# Patient Record
Sex: MALE | Race: WHITE | Employment: PART TIME | URBAN - METROPOLITAN AREA
[De-identification: names, ages, dates, MRNs, and addresses within clinical notes are randomized per-mention and may not be internally consistent; named-entity substitution may affect disease eponyms.]

---

## 2018-10-15 ENCOUNTER — HOSPITAL ENCOUNTER (EMERGENCY)
Age: 23
Discharge: HOME | End: 2018-10-15
Attending: EMERGENCY MEDICINE
Payer: COMMERCIAL

## 2018-10-15 ENCOUNTER — APPOINTMENT (OUTPATIENT)
Dept: GENERAL RADIOLOGY | Age: 23
End: 2018-10-15
Payer: COMMERCIAL

## 2018-10-15 VITALS
HEIGHT: 70 IN | SYSTOLIC BLOOD PRESSURE: 114 MMHG | WEIGHT: 230 LBS | RESPIRATION RATE: 18 BRPM | OXYGEN SATURATION: 97 % | HEART RATE: 92 BPM | BODY MASS INDEX: 32.93 KG/M2 | DIASTOLIC BLOOD PRESSURE: 59 MMHG | TEMPERATURE: 98.9 F

## 2018-10-15 DIAGNOSIS — J98.01 BRONCHOSPASM: ICD-10-CM

## 2018-10-15 DIAGNOSIS — B34.9 VIRAL SYNDROME: Primary | ICD-10-CM

## 2018-10-15 DIAGNOSIS — E86.0 DEHYDRATION: ICD-10-CM

## 2018-10-15 LAB
ANION GAP SERPL CALCULATED.3IONS-SCNC: 15 MMOL/L (ref 7–16)
BUN BLDV-MCNC: 11 MG/DL (ref 6–20)
CALCIUM SERPL-MCNC: 9.1 MG/DL (ref 8.6–10.2)
CHLORIDE BLD-SCNC: 103 MMOL/L (ref 98–107)
CO2: 25 MMOL/L (ref 22–29)
CREAT SERPL-MCNC: 0.9 MG/DL (ref 0.7–1.2)
EKG ATRIAL RATE: 83 BPM
EKG P AXIS: 2 DEGREES
EKG P-R INTERVAL: 202 MS
EKG Q-T INTERVAL: 394 MS
EKG QRS DURATION: 154 MS
EKG QTC CALCULATION (BAZETT): 462 MS
EKG R AXIS: -42 DEGREES
EKG T AXIS: 90 DEGREES
EKG VENTRICULAR RATE: 83 BPM
GFR AFRICAN AMERICAN: >60
GFR NON-AFRICAN AMERICAN: >60 ML/MIN/1.73
GLUCOSE BLD-MCNC: 102 MG/DL (ref 74–109)
HCT VFR BLD CALC: 42.9 % (ref 37–54)
HEMOGLOBIN: 15 G/DL (ref 12.5–16.5)
INFLUENZA A BY PCR: NOT DETECTED
INFLUENZA B BY PCR: NOT DETECTED
LACTIC ACID: 1 MMOL/L (ref 0.5–2.2)
LACTIC ACID: 3.2 MMOL/L (ref 0.5–2.2)
LIPASE: 23 U/L (ref 13–60)
MCH RBC QN AUTO: 31.8 PG (ref 26–35)
MCHC RBC AUTO-ENTMCNC: 35 % (ref 32–34.5)
MCV RBC AUTO: 91.1 FL (ref 80–99.9)
PDW BLD-RTO: 12.2 FL (ref 11.5–15)
PLATELET # BLD: 145 E9/L (ref 130–450)
PMV BLD AUTO: 10.7 FL (ref 7–12)
POTASSIUM SERPL-SCNC: 4 MMOL/L (ref 3.5–5)
RBC # BLD: 4.71 E12/L (ref 3.8–5.8)
SODIUM BLD-SCNC: 143 MMOL/L (ref 132–146)
TROPONIN: <0.01 NG/ML (ref 0–0.03)
WBC # BLD: 8.9 E9/L (ref 4.5–11.5)

## 2018-10-15 PROCEDURE — 94640 AIRWAY INHALATION TREATMENT: CPT

## 2018-10-15 PROCEDURE — 6370000000 HC RX 637 (ALT 250 FOR IP): Performed by: EMERGENCY MEDICINE

## 2018-10-15 PROCEDURE — 83690 ASSAY OF LIPASE: CPT

## 2018-10-15 PROCEDURE — 80048 BASIC METABOLIC PNL TOTAL CA: CPT

## 2018-10-15 PROCEDURE — 36415 COLL VENOUS BLD VENIPUNCTURE: CPT

## 2018-10-15 PROCEDURE — 2580000003 HC RX 258

## 2018-10-15 PROCEDURE — 87040 BLOOD CULTURE FOR BACTERIA: CPT

## 2018-10-15 PROCEDURE — 99284 EMERGENCY DEPT VISIT MOD MDM: CPT

## 2018-10-15 PROCEDURE — 85027 COMPLETE CBC AUTOMATED: CPT

## 2018-10-15 PROCEDURE — 87502 INFLUENZA DNA AMP PROBE: CPT

## 2018-10-15 PROCEDURE — 84484 ASSAY OF TROPONIN QUANT: CPT

## 2018-10-15 PROCEDURE — 71046 X-RAY EXAM CHEST 2 VIEWS: CPT

## 2018-10-15 PROCEDURE — 96374 THER/PROPH/DIAG INJ IV PUSH: CPT

## 2018-10-15 PROCEDURE — 6360000002 HC RX W HCPCS: Performed by: EMERGENCY MEDICINE

## 2018-10-15 PROCEDURE — 83605 ASSAY OF LACTIC ACID: CPT

## 2018-10-15 PROCEDURE — 96361 HYDRATE IV INFUSION ADD-ON: CPT

## 2018-10-15 PROCEDURE — 2580000003 HC RX 258: Performed by: EMERGENCY MEDICINE

## 2018-10-15 RX ORDER — ALBUTEROL SULFATE 90 UG/1
2 AEROSOL, METERED RESPIRATORY (INHALATION) EVERY 6 HOURS PRN
Qty: 1 INHALER | Refills: 3 | Status: SHIPPED | OUTPATIENT
Start: 2018-10-15

## 2018-10-15 RX ORDER — SODIUM CHLORIDE 0.9 % (FLUSH) 0.9 %
SYRINGE (ML) INJECTION
Status: COMPLETED
Start: 2018-10-15 | End: 2018-10-15

## 2018-10-15 RX ORDER — 0.9 % SODIUM CHLORIDE 0.9 %
500 INTRAVENOUS SOLUTION INTRAVENOUS ONCE
Status: COMPLETED | OUTPATIENT
Start: 2018-10-15 | End: 2018-10-15

## 2018-10-15 RX ORDER — METHYLPREDNISOLONE SODIUM SUCCINATE 125 MG/2ML
125 INJECTION, POWDER, LYOPHILIZED, FOR SOLUTION INTRAMUSCULAR; INTRAVENOUS ONCE
Status: COMPLETED | OUTPATIENT
Start: 2018-10-15 | End: 2018-10-15

## 2018-10-15 RX ORDER — IPRATROPIUM BROMIDE AND ALBUTEROL SULFATE 2.5; .5 MG/3ML; MG/3ML
1 SOLUTION RESPIRATORY (INHALATION) ONCE
Status: COMPLETED | OUTPATIENT
Start: 2018-10-15 | End: 2018-10-15

## 2018-10-15 RX ORDER — IBUPROFEN 200 MG
400 TABLET ORAL EVERY 6 HOURS PRN
COMMUNITY

## 2018-10-15 RX ORDER — METOPROLOL SUCCINATE 25 MG/1
25 TABLET, EXTENDED RELEASE ORAL DAILY
COMMUNITY
Start: 2018-07-11

## 2018-10-15 RX ORDER — ONDANSETRON 4 MG/1
4 TABLET, ORALLY DISINTEGRATING ORAL EVERY 8 HOURS PRN
Qty: 10 TABLET | Refills: 0 | Status: SHIPPED | OUTPATIENT
Start: 2018-10-15 | End: 2019-04-06

## 2018-10-15 RX ADMIN — SODIUM CHLORIDE 500 ML: 9 INJECTION, SOLUTION INTRAVENOUS at 11:06

## 2018-10-15 RX ADMIN — METHYLPREDNISOLONE SODIUM SUCCINATE 125 MG: 125 INJECTION, POWDER, FOR SOLUTION INTRAMUSCULAR; INTRAVENOUS at 12:31

## 2018-10-15 RX ADMIN — SODIUM CHLORIDE 500 ML: 9 INJECTION, SOLUTION INTRAVENOUS at 12:30

## 2018-10-15 RX ADMIN — Medication: at 14:10

## 2018-10-15 RX ADMIN — IPRATROPIUM BROMIDE AND ALBUTEROL SULFATE 1 AMPULE: .5; 3 SOLUTION RESPIRATORY (INHALATION) at 10:01

## 2018-10-15 ASSESSMENT — ENCOUNTER SYMPTOMS
NAUSEA: 1
DIARRHEA: 1
EYES NEGATIVE: 1
WHEEZING: 1
CHEST TIGHTNESS: 1
ABDOMINAL PAIN: 1
SHORTNESS OF BREATH: 1
VOMITING: 1
COUGH: 1

## 2018-10-15 ASSESSMENT — PAIN DESCRIPTION - PROGRESSION: CLINICAL_PROGRESSION: GRADUALLY WORSENING

## 2018-10-15 ASSESSMENT — PAIN DESCRIPTION - LOCATION: LOCATION: ABDOMEN

## 2018-10-15 ASSESSMENT — PAIN DESCRIPTION - FREQUENCY: FREQUENCY: INTERMITTENT

## 2018-10-15 ASSESSMENT — PAIN DESCRIPTION - ORIENTATION: ORIENTATION: MID

## 2018-10-15 ASSESSMENT — PAIN DESCRIPTION - ONSET: ONSET: SUDDEN

## 2018-10-15 ASSESSMENT — PAIN DESCRIPTION - PAIN TYPE: TYPE: ACUTE PAIN

## 2018-10-15 ASSESSMENT — PAIN SCALES - GENERAL: PAINLEVEL_OUTOF10: 7

## 2018-10-15 ASSESSMENT — PAIN DESCRIPTION - DESCRIPTORS: DESCRIPTORS: STABBING;SORE;SHARP

## 2018-10-20 LAB
BLOOD CULTURE, ROUTINE: NORMAL
CULTURE, BLOOD 2: NORMAL

## 2019-04-06 ENCOUNTER — HOSPITAL ENCOUNTER (EMERGENCY)
Age: 24
Discharge: HOME | End: 2019-04-06
Payer: COMMERCIAL

## 2019-04-06 VITALS
RESPIRATION RATE: 16 BRPM | OXYGEN SATURATION: 95 % | HEART RATE: 88 BPM | DIASTOLIC BLOOD PRESSURE: 79 MMHG | TEMPERATURE: 98.7 F | WEIGHT: 220 LBS | SYSTOLIC BLOOD PRESSURE: 135 MMHG | BODY MASS INDEX: 31.5 KG/M2 | HEIGHT: 70 IN

## 2019-04-06 DIAGNOSIS — R11.2 NAUSEA VOMITING AND DIARRHEA: Primary | ICD-10-CM

## 2019-04-06 DIAGNOSIS — R19.7 NAUSEA VOMITING AND DIARRHEA: Primary | ICD-10-CM

## 2019-04-06 LAB
ALBUMIN SERPL-MCNC: 5 G/DL (ref 3.5–5.2)
ALP BLD-CCNC: 55 U/L (ref 40–129)
ALT SERPL-CCNC: 27 U/L (ref 0–40)
ANION GAP SERPL CALCULATED.3IONS-SCNC: 11 MMOL/L (ref 7–16)
AST SERPL-CCNC: 19 U/L (ref 0–39)
BACTERIA: ABNORMAL /HPF
BASOPHILS ABSOLUTE: 0.09 E9/L (ref 0–0.2)
BASOPHILS RELATIVE PERCENT: 0.9 % (ref 0–2)
BILIRUB SERPL-MCNC: 0.8 MG/DL (ref 0–1.2)
BILIRUBIN URINE: NEGATIVE
BLOOD, URINE: NEGATIVE
BUN BLDV-MCNC: 12 MG/DL (ref 6–20)
CALCIUM SERPL-MCNC: 9.6 MG/DL (ref 8.6–10.2)
CHLORIDE BLD-SCNC: 102 MMOL/L (ref 98–107)
CLARITY: CLEAR
CO2: 26 MMOL/L (ref 22–29)
COLOR: YELLOW
CREAT SERPL-MCNC: 0.9 MG/DL (ref 0.7–1.2)
EOSINOPHILS ABSOLUTE: 0 E9/L (ref 0.05–0.5)
EOSINOPHILS RELATIVE PERCENT: 0.2 % (ref 0–6)
EPITHELIAL CELLS, UA: ABNORMAL /HPF
GFR AFRICAN AMERICAN: >60
GFR NON-AFRICAN AMERICAN: >60 ML/MIN/1.73
GLUCOSE BLD-MCNC: 118 MG/DL (ref 74–99)
GLUCOSE URINE: NEGATIVE MG/DL
HCT VFR BLD CALC: 41.8 % (ref 37–54)
HEMOGLOBIN: 14.9 G/DL (ref 12.5–16.5)
INFLUENZA A BY PCR: NOT DETECTED
INFLUENZA B BY PCR: NOT DETECTED
KETONES, URINE: 40 MG/DL
LACTIC ACID: 1.2 MMOL/L (ref 0.5–2.2)
LEUKOCYTE ESTERASE, URINE: NEGATIVE
LIPASE: 17 U/L (ref 13–60)
LYMPHOCYTES ABSOLUTE: 0.4 E9/L (ref 1.5–4)
LYMPHOCYTES RELATIVE PERCENT: 4.3 % (ref 20–42)
MCH RBC QN AUTO: 32 PG (ref 26–35)
MCHC RBC AUTO-ENTMCNC: 35.6 % (ref 32–34.5)
MCV RBC AUTO: 89.9 FL (ref 80–99.9)
MONOCYTES ABSOLUTE: 0.5 E9/L (ref 0.1–0.95)
MONOCYTES RELATIVE PERCENT: 5.2 % (ref 2–12)
MUCUS: PRESENT
NEUTROPHILS ABSOLUTE: 8.91 E9/L (ref 1.8–7.3)
NEUTROPHILS RELATIVE PERCENT: 89.6 % (ref 43–80)
NITRITE, URINE: NEGATIVE
PDW BLD-RTO: 11.9 FL (ref 11.5–15)
PH UA: 6.5 (ref 5–9)
PLATELET # BLD: 165 E9/L (ref 130–450)
PMV BLD AUTO: 10.5 FL (ref 7–12)
POTASSIUM SERPL-SCNC: 3.7 MMOL/L (ref 3.5–5)
PROTEIN UA: 30 MG/DL
RBC # BLD: 4.65 E12/L (ref 3.8–5.8)
RBC # BLD: NORMAL 10*6/UL
RBC UA: ABNORMAL /HPF (ref 0–2)
SODIUM BLD-SCNC: 139 MMOL/L (ref 132–146)
SPECIFIC GRAVITY UA: 1.02 (ref 1–1.03)
TOTAL PROTEIN: 7.4 G/DL (ref 6.4–8.3)
UROBILINOGEN, URINE: 0.2 E.U./DL
WBC # BLD: 9.9 E9/L (ref 4.5–11.5)
WBC UA: ABNORMAL /HPF (ref 0–5)

## 2019-04-06 PROCEDURE — 87502 INFLUENZA DNA AMP PROBE: CPT

## 2019-04-06 PROCEDURE — 99284 EMERGENCY DEPT VISIT MOD MDM: CPT

## 2019-04-06 PROCEDURE — 83690 ASSAY OF LIPASE: CPT

## 2019-04-06 PROCEDURE — 36415 COLL VENOUS BLD VENIPUNCTURE: CPT

## 2019-04-06 PROCEDURE — 96374 THER/PROPH/DIAG INJ IV PUSH: CPT

## 2019-04-06 PROCEDURE — 2580000003 HC RX 258: Performed by: NURSE PRACTITIONER

## 2019-04-06 PROCEDURE — 81001 URINALYSIS AUTO W/SCOPE: CPT

## 2019-04-06 PROCEDURE — 83605 ASSAY OF LACTIC ACID: CPT

## 2019-04-06 PROCEDURE — 85025 COMPLETE CBC W/AUTO DIFF WBC: CPT

## 2019-04-06 PROCEDURE — 6360000002 HC RX W HCPCS: Performed by: NURSE PRACTITIONER

## 2019-04-06 PROCEDURE — 80053 COMPREHEN METABOLIC PANEL: CPT

## 2019-04-06 RX ORDER — 0.9 % SODIUM CHLORIDE 0.9 %
1000 INTRAVENOUS SOLUTION INTRAVENOUS ONCE
Status: COMPLETED | OUTPATIENT
Start: 2019-04-06 | End: 2019-04-06

## 2019-04-06 RX ORDER — ONDANSETRON 2 MG/ML
4 INJECTION INTRAMUSCULAR; INTRAVENOUS ONCE
Status: COMPLETED | OUTPATIENT
Start: 2019-04-06 | End: 2019-04-06

## 2019-04-06 RX ORDER — ONDANSETRON 4 MG/1
4 TABLET, ORALLY DISINTEGRATING ORAL EVERY 8 HOURS PRN
Qty: 10 TABLET | Refills: 0 | Status: SHIPPED | OUTPATIENT
Start: 2019-04-06 | End: 2020-04-05

## 2019-04-06 RX ADMIN — SODIUM CHLORIDE 1000 ML: 900 INJECTION, SOLUTION INTRAVENOUS at 21:33

## 2019-04-06 RX ADMIN — ONDANSETRON 4 MG: 2 INJECTION INTRAMUSCULAR; INTRAVENOUS at 21:33

## 2019-04-06 ASSESSMENT — PAIN DESCRIPTION - PAIN TYPE: TYPE: ACUTE PAIN

## 2019-04-06 ASSESSMENT — PAIN SCALES - GENERAL: PAINLEVEL_OUTOF10: 8

## 2019-04-07 NOTE — ED PROVIDER NOTES
rhythm, normal heart sounds, without pathological murmurs, ectopy, gallops, or rubs. GI:  General Appearance: normal.       Bowel sounds: normal bowel sounds. Distension:  None. Tenderness: Mild generalized abdominal tenderness. Liver: non-tender. Spleen:  non-palpable and non-tender. Pulsatile Mass: absent. Hernia:  no inguinal or femoral hernias noted. Rectal: (chaperone present during examination). not indicated / deferred. Back: CVA Tenderness: No.  Integument:  Normal turgor. Warm, dry, without visible rash, unless noted elsewhere. Lymphatics: No lymphangitis or adenopathy noted. Neurological:  Oriented. Motor functions intact.     Lab / Imaging Results   (All laboratory and radiology results have been personally reviewed by myself)  Labs:  Results for orders placed or performed during the hospital encounter of 04/06/19   Rapid influenza A/B antigens   Result Value Ref Range    Influenza A by PCR Not Detected Not Detected    Influenza B by PCR Not Detected Not Detected   Comprehensive Metabolic Panel   Result Value Ref Range    Sodium 139 132 - 146 mmol/L    Potassium 3.7 3.5 - 5.0 mmol/L    Chloride 102 98 - 107 mmol/L    CO2 26 22 - 29 mmol/L    Anion Gap 11 7 - 16 mmol/L    Glucose 118 (H) 74 - 99 mg/dL    BUN 12 6 - 20 mg/dL    CREATININE 0.9 0.7 - 1.2 mg/dL    GFR Non-African American >60 >=60 mL/min/1.73    GFR African American >60     Calcium 9.6 8.6 - 10.2 mg/dL    Total Protein 7.4 6.4 - 8.3 g/dL    Alb 5.0 3.5 - 5.2 g/dL    Total Bilirubin 0.8 0.0 - 1.2 mg/dL    Alkaline Phosphatase 55 40 - 129 U/L    ALT 27 0 - 40 U/L    AST 19 0 - 39 U/L   CBC Auto Differential   Result Value Ref Range    WBC 9.9 4.5 - 11.5 E9/L    RBC 4.65 3.80 - 5.80 E12/L    Hemoglobin 14.9 12.5 - 16.5 g/dL    Hematocrit 41.8 37.0 - 54.0 %    MCV 89.9 80.0 - 99.9 fL    MCH 32.0 26.0 - 35.0 pg    MCHC 35.6 (H) 32.0 - 34.5 %    RDW 11.9 11.5 - 15.0 fL Platelets 197 216 - 456 E9/L    MPV 10.5 7.0 - 12.0 fL    Neutrophils % 89.6 (H) 43.0 - 80.0 %    Lymphocytes % 4.3 (L) 20.0 - 42.0 %    Monocytes % 5.2 2.0 - 12.0 %    Eosinophils % 0.2 0.0 - 6.0 %    Basophils % 0.9 0.0 - 2.0 %    Neutrophils # 8.91 (H) 1.80 - 7.30 E9/L    Lymphocytes # 0.40 (L) 1.50 - 4.00 E9/L    Monocytes # 0.50 0.10 - 0.95 E9/L    Eosinophils # 0.00 (L) 0.05 - 0.50 E9/L    Basophils # 0.09 0.00 - 0.20 E9/L    RBC Morphology Normal    Lipase   Result Value Ref Range    Lipase 17 13 - 60 U/L   Urinalysis   Result Value Ref Range    Color, UA Yellow Straw/Yellow    Clarity, UA Clear Clear    Glucose, Ur Negative Negative mg/dL    Bilirubin Urine Negative Negative    Ketones, Urine 40 (A) Negative mg/dL    Specific Gravity, UA 1.025 1.005 - 1.030    Blood, Urine Negative Negative    pH, UA 6.5 5.0 - 9.0    Protein, UA 30 (A) Negative mg/dL    Urobilinogen, Urine 0.2 <2.0 E.U./dL    Nitrite, Urine Negative Negative    Leukocyte Esterase, Urine Negative Negative   Lactic acid, plasma   Result Value Ref Range    Lactic Acid 1.2 0.5 - 2.2 mmol/L   Microscopic Urinalysis   Result Value Ref Range    Mucus, UA Present     WBC, UA 1-3 0 - 5 /HPF    RBC, UA NONE 0 - 2 /HPF    Epi Cells RARE /HPF    Bacteria, UA RARE (A) /HPF     Imaging: All Radiology results interpreted by Radiologist unless otherwise noted. No orders to display     ED Course / Medical Decision Making     Medications   0.9 % sodium chloride bolus (1,000 mLs Intravenous New Bag 4/6/19 2133)   ondansetron (ZOFRAN) injection 4 mg (4 mg Intravenous Given 4/6/19 2133)        Re-examination:  4/6/19       Time: 2200   Patient resting no distress. No episodes of vomiting. Negative for acute abdomen. Consult(s):   none. Procedure(s):   none    MDM:   Patient presented with nausea, vomiting, and generalized body aches. His diagnostics reviewed and discussed with him and his mother. Patient's symptoms improved with Zofran and IV fluid. He will be discharged with Zofran and instructed to continue oral hydration at home and follow-up with primary care. He is instructed to return to emergency department immediately if any new or worsening symptoms. Counseling: The emergency provider has spoken with the patient and mother and discussed todays results, in addition to providing specific details for the plan of care and counseling regarding the diagnosis and prognosis. Questions are answered at this time and they are agreeable with the plan. Assessment     1. Nausea vomiting and diarrhea      Plan   Discharge to home  Patient condition is good    New Medications     New Prescriptions    ONDANSETRON (ZOFRAN ODT) 4 MG DISINTEGRATING TABLET    Take 1 tablet by mouth every 8 hours as needed for Nausea or Vomiting     Electronically signed by ONIEL Rainey CNP   DD: 4/6/19  **This report was transcribed using voice recognition software. Every effort was made to ensure accuracy; however, inadvertent computerized transcription errors may be present.   END OF ED PROVIDER NOTE     ONIEL Mendez CNP  04/06/19 [id number]

## 2023-08-31 PROBLEM — R00.2 HEART PALPITATIONS: Status: ACTIVE | Noted: 2023-08-31

## 2023-08-31 PROBLEM — I51.7 LEFT VENTRICULAR DILATION: Status: ACTIVE | Noted: 2023-08-31

## 2023-08-31 PROBLEM — Z95.4: Status: ACTIVE | Noted: 2023-08-31

## 2023-08-31 PROBLEM — I35.1 MILD AORTIC REGURGITATION: Status: ACTIVE | Noted: 2023-08-31

## 2023-08-31 PROBLEM — Z87.74 STATUS POST AORTIC COARCTATION REPAIR: Status: ACTIVE | Noted: 2023-08-31

## 2023-08-31 PROBLEM — Q25.1 COARCTATION OF AORTA (HHS-HCC): Status: ACTIVE | Noted: 2023-08-31

## 2023-08-31 PROBLEM — I34.0 NON-RHEUMATIC MITRAL REGURGITATION: Status: ACTIVE | Noted: 2023-08-31

## 2023-08-31 PROBLEM — I49.8 FLUTTERING HEART: Status: ACTIVE | Noted: 2023-08-31

## 2023-08-31 PROBLEM — Q24.4 SUBAORTIC STENOSIS (HHS-HCC): Status: ACTIVE | Noted: 2023-08-31

## 2023-08-31 PROBLEM — Q23.81 BICUSPID AORTIC VALVE: Status: ACTIVE | Noted: 2023-08-31

## 2023-08-31 PROBLEM — Q24.9: Status: ACTIVE | Noted: 2023-08-31

## 2023-08-31 PROBLEM — I49.1 PREMATURE ATRIAL CONTRACTION: Status: ACTIVE | Noted: 2023-08-31

## 2023-08-31 PROBLEM — Q23.1 BICUSPID AORTIC VALVE (HHS-HCC): Status: ACTIVE | Noted: 2023-08-31

## 2023-08-31 PROBLEM — Z95.2 H/O AORTIC VALVE REPLACEMENT: Status: ACTIVE | Noted: 2023-08-31

## 2023-08-31 PROBLEM — Q24.8 SHONE COMPLEX: Status: ACTIVE | Noted: 2023-08-31

## 2023-08-31 PROBLEM — I77.810 DILATED AORTIC ROOT (CMS-HCC): Status: ACTIVE | Noted: 2023-08-31

## 2023-08-31 PROBLEM — Q23.2: Status: ACTIVE | Noted: 2023-08-31

## 2023-08-31 RX ORDER — ASPIRIN 81 MG/1
81 TABLET ORAL DAILY
COMMUNITY
End: 2023-12-20 | Stop reason: SDUPTHER

## 2023-08-31 RX ORDER — METOPROLOL SUCCINATE 50 MG/1
1 TABLET, EXTENDED RELEASE ORAL DAILY
COMMUNITY
End: 2023-12-20 | Stop reason: SDUPTHER

## 2023-10-09 DIAGNOSIS — Q23.1 BICUSPID AORTIC VALVE (HHS-HCC): ICD-10-CM

## 2023-10-09 DIAGNOSIS — Q25.1 COARCTATION OF AORTA (HHS-HCC): ICD-10-CM

## 2023-10-31 NOTE — PROGRESS NOTES
Patient Name: Juan Carlos Peguero   Provider: Jesus Winchester MD  : 1995  Date of Service: 11/10/2023  Referring:     DIAGNOSES:     1. Shone's Variant; Coarctation of Aorta  - 1994 Resection of coarctation with end to end anastomosis via Lt thoracotomy ([facility])  - diffuse narrowing of thoracic aorta without discrete stenosis with RUE BP > LUE BP  - Negative MRA head 3/7/23  - TTE 11/10/23 peak systolic gradient in descending aorta 33 mmHg  2. Subaortic Membrane  - 1996 resection of subaortic stenosis and aortic valvuloplasty ([facility])  - TTE  no gradient  - TTE 11/10/23 no gradient  3. Bicuspid Aortic Valve (Fusion non coronary right coronary cusp area)  - 1996 aortic valve repair (commissurotomy)  - 6/3/2010 s/p Ross Procedure with a 28 mm pulmonary Homograft, subvalvular resection ( [facility])  - TTE 22 Mild hamilton-AI, no aortic valve stenosis, mild PI, mild PS, aortic root 4.7 cm  - TTE 11/10/23 Mild hamilton-AI, slightly worse than previous echo, no aortic valve stenosis, aortic root 4.35 cm, mild PI, mild PS  4. Mitral Valve Regurgitation  - CMRI 2022 Abnormal mitral valve apparatus with redundant chordal attachments  and abnormal papillary muscle configuration, mild MR    -TTE 22 trivial MR   -TTE 11/10/23 parachute mitral valve, no MS, trivial MR, mean gradient 2.2 mmHg  5. Palpitations  - Zio 2022 with NSR, brief SVT/VT, not felt by patient  - On Metoprolol Succinate 50 mg daily  - Zio placed 11/10/23 for palpitations      INTERVAL HISTORY:     I saw Juan Carlos Peguero today in the [facility], [facility] and [facility] [place] at [facility]  He was last seen as a new patient visit to [facility] 22.  At that visit, he was doing well.  We started asa 81 mg daily for his prosthetic pulmonary valve.  He underwent screening for [place] aneurysms which  was negative.    Juan Carlos is a 27 y/oM with Shone's Complex variant. He underwent repair of coarctation of aorta in infancy. In 1996, he underwent aortic valve repair (bicuspid aortic valve), and resection of subaortic membrane. In 2010, he underwent the Ross Procedure, with a #28 mm pulmonary homograft.      He has followed lifelong with [facility] pediatric cardiology.      He was seen by  EPS Dr. Pete Fernando for palpitations. He had a Zio in 4/2022 which showed SR, brief SVT/VT, not felt by patient. He takes Metoprolol Succinate 50 mg daily.     At today's visit, Juan Carlos complains of palpitations that occur at least once a month at rest, last for a few minutes and resolve with deep breaths. Juan Carlos does  not have any associated dizziness, syncope or chest pain. 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 Drinks alcohol occasionally.     ROS:   General: no fatigue, no fever, no weight loss, no excessive sweating, no decreased appetite  HEENT: no facial swelling, no hoarseness, no eye redness, no eye lid swelling  Cardiac: has palpitations. no fainting, no cyanosis, no chest pain  Respiratory: no shortness of breath, no coughing blood, no noisy breathing, no wheezing, no cough  GI: No abdomen pain, no constipation, no diarrhea, no vomiting  Musculoskeletal: no extremity swelling  Skin: no paleness, no rash, no yellow skin  Hematologic: no easy bruising, no easy bleeding  Neurologic: no seizures, no weakness    All systems negative unless otherwise stated.    CURRENT MEDS:    Current Outpatient Medications:     aspirin 81 mg EC tablet, Take 1 tablet (81 mg) by mouth once daily., Disp: , Rfl:     metoprolol  "succinate XL (Toprol-XL) 50 mg 24 hr tablet, Take 1 tablet (50 mg) by mouth once daily., Disp: , Rfl:     PHYSICAL EXAM:  /70 (BP Location: Right leg)   Pulse 62   Ht 1.75 m (5' 8.9\")   Wt 100 kg (221 lb 3.7 oz)   SpO2 97%   BMI 32.77 kg/m²   Body mass index is 32.77 kg/m².    General: Well appearing and in no distress  HEENT: Moist mucous membranes  Neck: Supple, JVP normal, Carotid upstrokes brisk bilaterally and without bruits  Respiratory: Clear to ausculation bilaterally; no wheezing/rales/rhonchi; respirations non-labored  Cardiovascular: RRR,normal PMI, normal S1 and S2. No S3 or S4. Grade II/VI JANEE RUSB>LUSB , no rubs or gallops.   Gastrointestinal: soft, non-tender, no organomegaly, no abnormal aortic pulsation  Extremities: warm and well perfused with no cyanosis, clubbing, or edema  Neurologic: awake/alert, no focal deficits    DATA:    TTE 11/10/23 personally reviewed by in clinic today:  Shones Variant  Normal LV/RV size and function, mild LVH  Dilated aortic root 4.35 cm  Mild hamilton-AI, slightly worse than previous echo  Hypoplastic aortic arch  [place] mitral valve, mean gradient 2.2 mmHg  Trivial MR, no MS  Mild PI, P 1/2 T 131 ms  Mild PS 23 mmHg  Pk systolic gradient in descending aorta 33 mmHg    MRA Head 3/7/23  Unremarkable examination of the intracranial vasculature. No aneurysm.     Echo 11/11/2022 images reviewed and interpreted by Philip Barger, MD Shones Variant  Normal LV/RV size and function, LVH  Dilated aortic root 4.35 cm  Mild AI  Hypoplastic aortic arch   Trivial MR  Mild PI  Mild PS 27  Pk systolic gradient in descending aorta 30 mmHg     4.12.2022 CMRI  IMPRESSION:  1. Shone complex status post coarctation of the aorta repair with  end-to-end anastomosis repair and bicuspid aortic valve S/P Ross  procedure.  2. Mild RV to PA conduit stenosis with peak flow velocity of 250  cm/sec. Mild conduit insufficiency with a regurgitant fraction of  17%.  3. Diffuse mild " hypoplasia of the ascending aorta, transverse aortic  arch, and aortic isthmus with no discrete narrowing. Mild flow  acceleration, peak velocity 251 cm/sec, through the proximal  descending aorta in a nonobstructive pattern by phase contrast.  4. Moderate dilation of the aortic autograft, stable in appearance  from prior cardiac MRI.  5. Trivial-to-mild hamilton-aortic regurgitation.  6. Abnormal mitral valve apparatus with redundant chordal attachments  and abnormal papillary muscle configuration.  7. Mild mitral valve regurgitation.  8. Left atrium appears normal in size.  9. Normal right ventricular size with no hypertrophy. Normal right  ventricular systolic function.  10. Normal left ventricular size with no hypertrophy. Normal left  ventricular systolic function.  11. No pericardial effusion.  12. The coronary arteries were not evaluated on this study.     Echo 10/4/2021  1. The aortic arch is not well visualized by 2-dimensional imaging. There is aliased color flow Doppler through the aortic isthmus.  2. Peak gradient across the area of aortic coarctation is 32.5 mmHg. Corrected for proximal flow velocity, the resulting calculated gradient is 23 mmHg.  3. There is mild conduit stenosis.  4. Mild conduit regurgitation.  5. Trivial hamilton-aortic valve regurgitation.  6. Mitral valve mean gradient = 4.4 mmHg.  7. Trivial mitral valve regurgitation.  8. Mild biatrial enlargement.  9. Left ventricle is normal in size. Normal systolic function.  10. Qualitatively normal right ventricular size and normal systolic function.  11. No pericardial effusion.        CPET 3/2/2020  Additional Summary:  1. The patient had his BP measured manually on the right arm only at rest, during exercise and in recovery.  2. The patient had an isolated PVC during exercise at 164 bpm and a ventricular couplet in recovery at a heart rate of 116 bpm.  3. Cannot conclusively comment on ST-segments, T-waves, or QTc in the setting of LBBB.  4. No  concerning arrhythmias during exercise or in recovery.  5. Normal HR, BP, and SpO2 saturation response with progressive exercise intensities.  6. The patient completed a peak speed of 3.5 mph with a 14.3% grade achieving 14 METS (estimated).         ASSESSMENT & PLAN:      Juan Carlos is a 27 y/oM with Shone's complex variant. He underwent end to end CoA repair in infancy, followed by subaortic membrane resection and aortic valve repair in 1996. In 2010, he underwent Ross Procedure with a #28 mm Homograft.        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  1994 s/p CoA repair via left thoracotomy (end to end anastomosis)  1996 s/p Subaortic membrane resection, AVr  2010 s/p Ross Procedure #28 mm Homograft, resection of subaortic membrane      Patient is doing well, echo today shows stable hypoplastic aortic arch, peak systolic gradient in descending aorta of 33 mmHg  He has an UE limb discrepancy with elevated SBP in RUE versus LUE, no gradient relative to lower extremity BP  He is on Metoprolol for aortopathy and h/o palpitations, not for BP  We will continue to monitor for changes in RUE BP and will treat accordingly     CMRI 4/2022 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 Mild flow acceleration, peak velocity 251 cm/sec, through the proximal descending aorta in a nonobstructive pattern by phase contrast.     Cross sectional chest imaging is recommended every 3-5 years.   He underwent screening for [place] of [place] aneurysms 3/2023 which was negative.  Next in 10 years.     2. Bicupsid Aortic Valve s/p Ross Procedure in 2010, [facility] #28 mm Homograft  Echo today shows stable dilated aortic root 4.35 cm, mild hamilton-AI, slightly worse than previous echo, mild PI and mild PS  CMRI 4/2022 Trivial-to-mild hamilton-aortic regurgitation.  CMRI 4/2022 Mild RV to PA conduit stenosis with peak flow velocity of 250 cm/sec. Mild conduit insufficiency with a  regurgitant fraction of17%.     He has stable moderate dilation of the aortic autograft which we will follow with serial echos and every other year MRI.  Cont ASA 81 mg daily bio-PVR  SBE prophylaxis IS indicated      3. Subaortic Membrane   Echo today shows no LVOT obstruction     4. Mitral Valve Regurgitation  Echo today shows parachute mitral valve with no mitral stenosis, mean gradient across the mitral valve of 2.2 mmHg, trivial MR.  CMRI 4/2022 Abnormal mitral valve apparatus with redundant chordal attachments and abnormal papillary muscle configuration.     5. Palpitations  Zio 4/2022 with NSR, brief SVT/VT, not felt by patient. On Metoprolol Succinate 50 mg daily  Complaints of palpitations at today's visit. Will place Zio today.  HR in clinic 62 bpm so we will have to see if he has HR room to up titrate BB based on Zio results.    f/u 1 year with ECHO    Overall, Juan Carlos is doing well with stable findings on today's echo. He does have a stable gradient across the hypoplastic arch into the descending aorta. This may never progress, but will need to follow serial cross sectional imaging. He complains of palpitations. He had brief SVT/VT on prior Zio. We will place a Zio monitor today to assess arrhythmia burden and heart rate range before making changes to his Metoprolol.      Patient was seen and discussed with attending Dr. Annabella Delgado MD  PGY-5, Pediatric Cardiology Fellow    I saw and evaluated the patient. I personally obtained the key and critical portions of the history and physical exam or was physically present for key and critical portions performed by the resident/fellow. I reviewed the resident/fellow's documentation and discussed the patient with the resident/fellow. I agree with the resident/fellow's medical decision making as documented in the note.    Jesus Winchester MD      Thank you for allowing me to participate in the care of this patient.  Please don't hesitate to contact us  with any questions or concerns.    Sincerely,    Jesus Winchester MD, MSc  Director, Adult Congenital Heart Disease Program   [facility] [place]

## 2023-11-08 ENCOUNTER — TELEPHONE (OUTPATIENT)
Dept: PEDIATRIC CARDIOLOGY | Facility: HOSPITAL | Age: 28
End: 2023-11-08
Payer: COMMERCIAL

## 2023-11-08 NOTE — TELEPHONE ENCOUNTER
11/08/23 at 12:30 PM     Called: [phone]  Spoke with: Patient    Confirmed upcoming appointment as follows:  Dr. Winchester  Location:  [place]  Date: 11/10  Time: echo at 8 and appointment at 9    Reviewed medications and allergies.   Patient confirmed understanding of appointment details, no further questions or issues to be addressed. Encouraged reaching out if there was anything else needed.        - Jaida Fitzgerald RN  [facility] Patient Navigator  [phone]

## 2023-11-08 NOTE — PATIENT INSTRUCTIONS
Your echo today was essentially unchanged from last year.  We will repeat this on an annual basis.  We will place a monitor today to evaluate the palpitations and then I will call you with the resulys and any medication changes.    Follow up with: Jesus Winchester MD    Date: 11/8/24 Time:  Echo: 8       Appointment: 9   Location: [place] [address]   Recommendations:   Endocarditis prophylaxis: You need antibiotics before dental cleanings and procedures. Call us if you need a prescription sent to your pharmacy. Please see the dentist every 6 months for a teeth cleaning.   Other:    ZIO heart monitor:  We are sending a ZIO® XT Patch (heart rhythm monitor) to you today. Please wear it for up to 14 days.  Please do not shower for the first 24 hours of wearing this patch, and when do you shower, please avoid the stream of water hitting the patch directly. Keep soaps and lotions away from the Zio. When towel-drying, hold the Zio patch down with one hand and press the patch against your skin to secure it. Please do not swim in a pool or go into a hot tub while wearing the Zio.   When you have any symptoms, such as palpitations or chest pain, press the button on the Zio patch and record your activity in the booklet that comes with it or in the serina that you can download.  After the recommended wear period, please remove the Zio patch (adhesive remover should be in the box to help with this). You can return the device as directed in the booklet.   If you have any questions, you can contact [employer] (Zio ) at [phone].   We will contact you with results; please allow up to two weeks from the time you send back the device.    [facility] program contact information:  [facility]: [phone]  [facility] Coordinator: [phone] (scheduling)  After hours: call [phone] to page on-call pediatric cardiology fellow at [phone]  Email: [email or web address]  May  Humberto  **If your pharmacy has any problems requesting refills from us for your cardiac medications, please contact us.

## 2023-11-10 ENCOUNTER — APPOINTMENT (OUTPATIENT)
Dept: PEDIATRIC CARDIOLOGY | Facility: CLINIC | Age: 28
End: 2023-11-10
Payer: COMMERCIAL

## 2023-11-10 ENCOUNTER — HOSPITAL ENCOUNTER (OUTPATIENT)
Dept: PEDIATRIC CARDIOLOGY | Facility: HOSPITAL | Age: 28
Discharge: HOME | End: 2023-11-10
Payer: COMMERCIAL

## 2023-11-10 ENCOUNTER — OFFICE VISIT (OUTPATIENT)
Dept: PEDIATRIC CARDIOLOGY | Facility: CLINIC | Age: 28
End: 2023-11-10
Payer: COMMERCIAL

## 2023-11-10 ENCOUNTER — ANCILLARY PROCEDURE (OUTPATIENT)
Dept: PEDIATRIC CARDIOLOGY | Facility: CLINIC | Age: 28
End: 2023-11-10
Payer: COMMERCIAL

## 2023-11-10 VITALS
DIASTOLIC BLOOD PRESSURE: 70 MMHG | WEIGHT: 221.23 LBS | HEART RATE: 62 BPM | SYSTOLIC BLOOD PRESSURE: 140 MMHG | HEIGHT: 69 IN | BODY MASS INDEX: 32.77 KG/M2 | OXYGEN SATURATION: 97 %

## 2023-11-10 DIAGNOSIS — Q23.1 BICUSPID AORTIC VALVE (HHS-HCC): ICD-10-CM

## 2023-11-10 DIAGNOSIS — Z95.4 STATUS POST ROSS PROCEDURE: ICD-10-CM

## 2023-11-10 DIAGNOSIS — R00.2 HEART PALPITATIONS: ICD-10-CM

## 2023-11-10 DIAGNOSIS — Q25.1 COARCTATION OF AORTA (HHS-HCC): ICD-10-CM

## 2023-11-10 DIAGNOSIS — Q25.1 COARCTATION OF AORTA (HHS-HCC): Primary | ICD-10-CM

## 2023-11-10 DIAGNOSIS — I34.0 NON-RHEUMATIC MITRAL REGURGITATION: ICD-10-CM

## 2023-11-10 DIAGNOSIS — Q24.4 CONGENITAL SUBAORTIC STENOSIS (HHS-HCC): ICD-10-CM

## 2023-11-10 LAB
AORTIC VALVE PEAK VELOCITY: 1.45
AV PEAK GRADIENT: 8.5
BODY SURFACE AREA: 2.21 M2
EJECTION FRACTION APICAL 4 CHAMBER: 49
MITRAL VALVE E/A RATIO: 1.71
MITRAL VALVE E/E' RATIO: 13.71
PULMONIC VALVE MEAN GRADIENT: 13.7
PULMONIC VALVE PEAK GRADIENT: 28.5

## 2023-11-10 PROCEDURE — 99214 OFFICE O/P EST MOD 30 MIN: CPT | Performed by: INTERNAL MEDICINE

## 2023-11-10 PROCEDURE — 93247 EXT ECG>7D<15D SCAN A/R: CPT

## 2023-11-10 PROCEDURE — 93303 ECHO TRANSTHORACIC: CPT | Performed by: PEDIATRICS

## 2023-11-10 PROCEDURE — 93325 DOPPLER ECHO COLOR FLOW MAPG: CPT | Performed by: PEDIATRICS

## 2023-11-10 PROCEDURE — 93320 DOPPLER ECHO COMPLETE: CPT | Performed by: PEDIATRICS

## 2023-12-20 DIAGNOSIS — R00.2 HEART PALPITATIONS: ICD-10-CM

## 2023-12-20 DIAGNOSIS — Z95.4 STATUS POST ROSS PROCEDURE: ICD-10-CM

## 2023-12-20 RX ORDER — ASPIRIN 81 MG/1
81 TABLET ORAL DAILY
Qty: 90 TABLET | Refills: 3 | Status: SHIPPED | OUTPATIENT
Start: 2023-12-20 | End: 2024-12-19

## 2023-12-20 RX ORDER — METOPROLOL SUCCINATE 50 MG/1
50 TABLET, EXTENDED RELEASE ORAL DAILY
Qty: 90 TABLET | Refills: 3 | Status: SHIPPED | OUTPATIENT
Start: 2023-12-20 | End: 2024-12-19

## 2023-12-20 NOTE — TELEPHONE ENCOUNTER
12/20/23     Received request from pharmacy for aspirin and metoprolol refills.    - Jaida Fitzgerald RN  [facility] Patient Navigator  [phone]

## 2024-10-09 NOTE — PROGRESS NOTES
Patient Name: Juan Carlos Peguero   Provider: Jesus Winchester MD  : 1995  Date of Service: 2024  Referring:     DIAGNOSES:     1. Shone's Variant; Coarctation of Aorta  - 1994 Resection of coarctation with end to end anastomosis via Lt thoracotomy ([facility])  - diffuse narrowing of thoracic aorta without discrete stenosis with RUE BP > LUE BP  - Negative MRA head 3/7/23  - TTE 11/10/23 peak systolic gradient in descending aorta 33 mmHg  2. Subaortic Membrane  - 1996 resection of subaortic stenosis and aortic valvuloplasty ([facility])  - TTE  no gradient  - TTE 11/10/23 no gradient  3. Bicuspid Aortic Valve (Fusion non coronary right coronary cusp area)  - 1996 aortic valve repair (commissurotomy)  - 6/3/2010 s/p Ross Procedure with a 28 mm pulmonary Homograft, subvalvular resection ( [facility])  - TTE 22 Mild hamilton-AI, no aortic valve stenosis, mild PI, mild PS, aortic root 4.7 cm  - TTE 11/10/23 Mild hamilton-AI, slightly worse than previous echo, no aortic valve stenosis, aortic root 4.35 cm, mild PI, mild PS  4. Mitral Valve Regurgitation  - CMRI 2022 Abnormal mitral valve apparatus with redundant chordal attachments  and abnormal papillary muscle configuration, mild MR    -TTE 22 trivial MR   -TTE 11/10/23 parachute mitral valve, no MS, trivial MR, mean gradient 2.2 mmHg  5. Palpitations  - Zio 2022 with NSR, brief SVT/VT, not felt by patient  - On Metoprolol Succinate 50 mg daily  - Zio placed 11/10/23 for palpitations      INTERVAL HISTORY:     I saw Juan Carlos Peguero today in the [facility], [facility] and [facility] [place] [facility].  He was last seen 11/10/23.  At that visit, he was doing well.      Juan Carlos is a 28 y/oM with Shone's Complex variant. He underwent repair of coarctation of aorta in infancy. In , he underwent aortic valve repair  "(bicuspid aortic valve), and resection of subaortic membrane. In 2010, he underwent the Ross Procedure, with a #28 mm pulmonary homograft.      He has followed lifelong with [facility] pediatric cardiology.      He was seen by  EPS Dr. Pete Fernando for palpitations. He had a Zio in 4/2022 which showed SR, brief SVT/VT, not felt by patient. He takes Metoprolol Succinate 50 mg daily.     At today's visit, he reports no new concerns.  No new symptoms.  Rare palpitations that he manages with deep breathing.  Works in maintenance.  No SOLIS, SOB, CP, edema.         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 Drinks alcohol occasionally.     ROS:   General: no fatigue, no fever, no weight loss, no excessive sweating, no decreased appetite  HEENT: no facial swelling, no hoarseness, no eye redness, no eye lid swelling  Cardiac: no palpitations. no fainting, no cyanosis, no chest pain  Respiratory: no shortness of breath, no coughing blood, no noisy breathing, no wheezing, no cough  GI: No abdomen pain, no constipation, no diarrhea, no vomiting  Musculoskeletal: no extremity swelling  Skin: no paleness, no rash, no yellow skin  Hematologic: no easy bruising, no easy bleeding  Neurologic: no seizures, no weakness    All systems negative unless otherwise stated.    CURRENT MEDS:    Current Outpatient Medications:     aspirin 81 mg EC tablet, Take 1 tablet (81 mg) by mouth once daily., Disp: 90 tablet, Rfl: 3    metoprolol succinate XL (Toprol-XL) 50 mg 24 hr tablet, Take 1 tablet (50 mg) by mouth once daily., Disp: 90 tablet, Rfl: 3    PHYSICAL EXAM:  /72 (BP Location: Right arm, Patient Position: Lying, BP Cuff Size: Large adult)   Pulse 58   Ht 1.755 m (5' 9.09\")   Wt 101 " kg (223 lb 8.7 oz)   SpO2 97%   BMI 32.92 kg/m²   Body mass index is 32.92 kg/m².    General: Well appearing and in no distress  HEENT: Moist mucous membranes  Neck: Supple, JVP normal, Carotid upstrokes brisk bilaterally and without bruits  Respiratory: Clear to ausculation bilaterally; no wheezing/rales/rhonchi; respirations non-labored  Cardiovascular: RRR,normal PMI, normal S1 and S2. No S3 or S4. Grade II/VI JANEE RUSB>LUSB , To and fro murmur mid sternum, no rubs or gallops.   Gastrointestinal: soft, non-tender, no organomegaly, no abnormal aortic pulsation  Extremities: warm and well perfused with no cyanosis, clubbing, or edema  Neurologic: awake/alert, no focal deficits    DATA:    TTE 11/8/24 personally reviewed by me in clinic today:  Normal LV size and function  Mild LVH  Mild AI, no stenosis  [place] MV  Mild MR  Mild MS mean 4 mm Hg  Mild PS - 26, PI is present  Mild TR  Normal RV size and function  Gradient of 30 through the isthmus  Aortic root 4.0  No change from last echo      TTE 11/10/23 personally reviewed by me:  Shones Variant  Normal LV/RV size and function, mild LVH  Dilated aortic root 4.35 cm  Mild hamilton-AI, slightly worse than previous echo  Hypoplastic aortic arch  [place] mitral valve, mean gradient 2.2 mmHg  Trivial MR, no MS  Mild PI, P 1/2 T 131 ms  Mild PS 23 mmHg  Pk systolic gradient in descending aorta 33 mmHg    MRA Head 3/7/23  Unremarkable examination of the intracranial vasculature. No aneurysm.     Echo 11/11/2022 images reviewed and interpreted by Jesus Winchester MD  Shones Variant  Normal LV/RV size and function, LVH  Dilated aortic root 4.35 cm  Mild AI  Hypoplastic aortic arch   Trivial MR  Mild PI  Mild PS 27  Pk systolic gradient in descending aorta 30 mmHg     4.12.2022 CMRI  IMPRESSION:  1. Shone complex status post coarctation of the aorta repair with  end-to-end anastomosis repair and bicuspid aortic valve S/P Ross  procedure.  2. Mild RV to PA conduit stenosis  with peak flow velocity of 250  cm/sec. Mild conduit insufficiency with a regurgitant fraction of  17%.  3. 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 Mild flow  acceleration, peak velocity 251 cm/sec, through the proximal  descending aorta in a nonobstructive pattern by phase contrast.  4. Moderate dilation of the aortic autograft, stable in appearance  from prior cardiac MRI.  5. Trivial-to-mild hamliton-aortic regurgitation.  6. Abnormal mitral valve apparatus with redundant chordal attachments  and abnormal papillary muscle configuration.  7. Mild mitral valve regurgitation.  8. Left atrium appears normal in size.  9. Normal right ventricular size with no hypertrophy. Normal right  ventricular systolic function.  10. Normal left ventricular size with no hypertrophy. Normal left  ventricular systolic function.  11. No pericardial effusion.  12. The coronary arteries were not evaluated on this study.     Echo 10/4/2021  1. The aortic arch is not well visualized by 2-dimensional imaging. There is aliased color flow Doppler through the aortic isthmus.  2. Peak gradient across the area of aortic coarctation is 32.5 mmHg. Corrected for proximal flow velocity, the resulting calculated gradient is 23 mmHg.  3. There is mild conduit stenosis.  4. Mild conduit regurgitation.  5. Trivial hamilton-aortic valve regurgitation.  6. Mitral valve mean gradient = 4.4 mmHg.  7. Trivial mitral valve regurgitation.  8. Mild biatrial enlargement.  9. Left ventricle is normal in size. Normal systolic function.  10. Qualitatively normal right ventricular size and normal systolic function.  11. No pericardial effusion.        CPET 3/2/2020  Additional Summary:  1. The patient had his BP measured manually on the right arm only at rest, during exercise and in recovery.  2. The patient had an isolated PVC during exercise at 164 bpm and a ventricular couplet in recovery at a  heart rate of 116 bpm.  3. Cannot conclusively comment on ST-segments, T-waves, or QTc in the setting of LBBB.  4. No concerning arrhythmias during exercise or in recovery.  5. Normal HR, BP, and SpO2 saturation response with progressive exercise intensities.  6. The patient completed a peak speed of 3.5 mph with a 14.3% grade achieving 14 METS (estimated).         ASSESSMENT & PLAN:      Juan Carlos is a 28 y/oM with Shone's complex variant. He underwent end to end CoA repair in infancy, followed by subaortic membrane resection and aortic valve repair in 1996. In 2010, he underwent Ross Procedure with a #28 mm Homograft.        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  1994 s/p CoA repair via left thoracotomy (end to end anastomosis)  1996 s/p Subaortic membrane resection, AVr  2010 s/p Ross Procedure #28 mm Homograft, resection of subaortic membrane      Patient is doing well, echo today shows stable hypoplastic aortic arch, peak systolic gradient in descending aorta of 30 mmHg  He has an UE limb discrepancy with elevated SBP in RUE versus LUE at times, no difference today, no gradient relative to lower extremity BP  He is on Metoprolol for aortopathy and h/o palpitations, not for BP  We will continue to monitor for changes in RUE BP and will manage accordingly     CMRI 4/2022 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 Mild flow acceleration, peak velocity 251 cm/sec, through the proximal descending aorta in a nonobstructive pattern by phase contrast.     Cross sectional chest imaging is recommended every 3-5 years.   He underwent screening for [place] of [place] aneurysms 3/2023 which was negative.  Next in 10 years.     2. Bicupsid Aortic Valve s/p Ross Procedure in 2010, [facility] #28 mm Homograft  Echo today shows stable dilated aortic root 4.0 cm, mild hamilton-AI, mild PI and mild PS  CMRI 4/2022 Trivial-to-mild hamilton-aortic regurgitation.  CMRI  4/2022 Mild RV to PA conduit stenosis with peak flow velocity of 250 cm/sec. Mild conduit insufficiency with a regurgitant fraction of17%.     He has stable moderate dilation of the aortic autograft which we will follow with serial imaging.  Cont ASA 81 mg daily bio-PVR  SBE prophylaxis IS indicated      3. Subaortic Membrane   Echo today shows no LVOT obstruction     4. Mitral Valve Regurgitation  Echo today shows  parachute mitral valve with mild mitral stenosis, mean gradient across the mitral valve of 4 mmHg, mild MR.  CMRI 4/2022 Abnormal mitral valve apparatus with redundant chordal attachments and abnormal papillary muscle configuration.     5. Palpitations  Zio 4/2022 with NSR, brief SVT/VT, not felt by patient. On Metoprolol Succinate 50 mg daily    f/u 1 year with ECHO    Overall, Juan Carlos is doing well with stable findings on today's echo. He does have a stable gradient across the hypoplastic arch into the descending aorta. This may never progress, but will need to follow serial cross sectional imaging.       Thank you for allowing me to participate in the care of this patient.  Please don't hesitate to contact us with any questions or concerns.    Sincerely,    Jesus Winchester MD, MSc  Director, Adult Congenital Heart Disease Program  [facility] [place]

## 2024-11-07 ENCOUNTER — TELEPHONE (OUTPATIENT)
Dept: PEDIATRIC CARDIOLOGY | Facility: HOSPITAL | Age: 29
End: 2024-11-07
Payer: COMMERCIAL

## 2024-11-07 NOTE — TELEPHONE ENCOUNTER
11/07/24 at 2:37 PM     Spoke with: Patient   [phone] (home)     Confirmed upcoming appointment as follows:  Provider: Dr. Winchester  Location:  [place]  Date: 11/8  Time: Echo: 8  Appointment: 9    Reviewed medications and allergies.   Patient confirmed understanding of appointment details, no further questions or issues to be addressed. Encouraged reaching out if there was anything else needed.        - Jaida Fitzgerald, RN  [facility] RN Patient Navigator  Pediatric Cardiology  [phone]

## 2024-11-07 NOTE — PATIENT INSTRUCTIONS
Your echo today has stable findings.   Next visit one year with echo.  No change in medications.    Follow up with: Dr. Sumeet Ansari   Date: 25   Time: Echo & EK  Appointment: 1230   Location: [facility] [place], [address]   Endocarditis prophylaxis:   You need antibiotics before dental cleanings and procedures. Call us if you need a prescription sent to your pharmacy. Please see the dentist every 6 months for a teeth cleaning.   Other:   No other special instructions     [facility] contact information:  [facility] Nurse line: [phone]  [facility] Coordinator: [phone] (scheduling)  After hours: call [phone] to page on-call pediatric cardiology fellow at [phone]  Email: [email or web address]  Nurse Fax: [phone]  Department Fax: [phone]  [facility]  **If your pharmacy has any problems requesting refills from us for your cardiac medications, please contact us.

## 2024-11-08 ENCOUNTER — APPOINTMENT (OUTPATIENT)
Dept: PEDIATRIC CARDIOLOGY | Facility: CLINIC | Age: 29
End: 2024-11-08
Payer: COMMERCIAL

## 2024-11-08 VITALS
OXYGEN SATURATION: 97 % | HEART RATE: 58 BPM | HEIGHT: 69 IN | BODY MASS INDEX: 33.11 KG/M2 | WEIGHT: 223.55 LBS | DIASTOLIC BLOOD PRESSURE: 72 MMHG | SYSTOLIC BLOOD PRESSURE: 120 MMHG

## 2024-11-08 DIAGNOSIS — Q23.81 BICUSPID AORTIC VALVE: ICD-10-CM

## 2024-11-08 DIAGNOSIS — Q23.2: ICD-10-CM

## 2024-11-08 DIAGNOSIS — Q25.1 COARCTATION OF AORTA (HHS-HCC): Primary | ICD-10-CM

## 2024-11-08 DIAGNOSIS — Z95.4 STATUS POST ROSS PROCEDURE: ICD-10-CM

## 2024-11-08 DIAGNOSIS — R00.2 HEART PALPITATIONS: ICD-10-CM

## 2024-11-08 DIAGNOSIS — I34.0 NON-RHEUMATIC MITRAL REGURGITATION: ICD-10-CM

## 2024-11-08 DIAGNOSIS — Q25.1 COARCTATION OF AORTA (HHS-HCC): ICD-10-CM

## 2024-11-08 LAB
AORTIC VALVE PEAK VELOCITY: 1.52 M/S
AV PEAK GRADIENT: 9.3 MMHG
EJECTION FRACTION APICAL 4 CHAMBER: 61
MITRAL VALVE E/A RATIO: 1.5
MITRAL VALVE E/E' RATIO: 10.79
PULMONIC VALVE MEAN GRADIENT: 12.2 MMHG
PULMONIC VALVE PEAK GRADIENT: 27.5 MMHG
TRICUSPID ANNULAR PLANE SYSTOLIC EXCURSION: 2.8 CM

## 2024-11-08 PROCEDURE — 3008F BODY MASS INDEX DOCD: CPT | Performed by: INTERNAL MEDICINE

## 2024-11-08 PROCEDURE — 99214 OFFICE O/P EST MOD 30 MIN: CPT | Performed by: INTERNAL MEDICINE

## 2024-11-08 NOTE — LETTER
2024     Saúl Cantu DO  [address] [place] [id number]    Patient: Juan Carlos Peguero   YOB: 1995   Date of Visit: 2024       Dear Dr. Saúl Cantu DO:    Thank you for referring Juan Carlos Peguero to me for evaluation. Below are my notes for this consultation.  If you have questions, please do not hesitate to call me. I look forward to following your patient along with you.       Sincerely,     Jesus Winchester MD      CC: No Recipients  ______________________________________________________________________________________    Patient Name: Juan Carlos Peguero   Provider: Jesus Winchester MD  : 1995  Date of Service: 2024  Referring:     DIAGNOSES:     1. Shone's Variant; Coarctation of Aorta  - 1994 Resection of coarctation with end to end anastomosis via Lt thoracotomy ([facility])  - diffuse narrowing of thoracic aorta without discrete stenosis with RUE BP > LUE BP  - Negative MRA head 3/7/23  - TTE 11/10/23 peak systolic gradient in descending aorta 33 mmHg  2. Subaortic Membrane  - 1996 resection of subaortic stenosis and aortic valvuloplasty ([facility])  - TTE  no gradient  - TTE 11/10/23 no gradient  3. Bicuspid Aortic Valve (Fusion non coronary right coronary cusp area)  - 1996 aortic valve repair (commissurotomy)  - 6/3/2010 s/p Ross Procedure with a 28 mm pulmonary Homograft, subvalvular resection ( [facility])  - TTE 22 Mild hamilton-AI, no aortic valve stenosis, mild PI, mild PS, aortic root 4.7 cm  - TTE 11/10/23 Mild hamilton-AI, slightly worse than previous echo, no aortic valve stenosis, aortic root 4.35 cm, mild PI, mild PS  4. Mitral Valve Regurgitation  - CMRI 2022 Abnormal mitral valve apparatus with redundant chordal attachments  and abnormal papillary muscle configuration, mild MR    -TTE 22 trivial MR   -TTE 11/10/23 parachute mitral valve, no MS, trivial MR, mean gradient 2.2 mmHg  5. Palpitations  -  Zio 4/2022 with NSR, brief SVT/VT, not felt by patient  - On Metoprolol Succinate 50 mg daily  - Zio placed 11/10/23 for palpitations      INTERVAL HISTORY:     I saw Juan Carlos Peguero today in the [facility],  [facility] and [facility].  He was last seen 11/10/23.  At that visit, he was doing well.      Juan Carlos is a 28 y/oM with Shone's Complex variant. He underwent repair of coarctation of aorta in infancy. In 1996, he underwent aortic valve repair (bicuspid aortic valve), and resection of subaortic membrane. In 2010, he underwent the Ross Procedure, with a #28 mm pulmonary homograft.      He has followed lifelong with [facility] pediatric cardiology.      He was seen by  [facility] Dr. Pete Fernando for palpitations. He had a Zio in 4/2022 which showed SR, brief SVT/VT, not felt by patient. He takes Metoprolol Succinate 50 mg daily.     At today's visit, he reports no new concerns.  No new symptoms.  Rare palpitations that he manages with deep breathing.  Works in maintenance.  No SOLIS, SOB, CP, edema.         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 Drinks alcohol occasionally.     ROS:   General: no fatigue, no fever, no weight loss, no excessive sweating, no decreased appetite  HEENT: no facial swelling, no hoarseness, no eye redness, no eye lid swelling  Cardiac: no palpitations. no fainting, no cyanosis, no chest pain  Respiratory: no shortness of breath, no coughing blood, no noisy breathing, no wheezing, no cough  GI: No abdomen pain, no constipation, no diarrhea, no vomiting  Musculoskeletal: no extremity swelling  Skin: no paleness, no rash, no  "yellow skin  Hematologic: no easy bruising, no easy bleeding  Neurologic: no seizures, no weakness    All systems negative unless otherwise stated.    CURRENT MEDS:    Current Outpatient Medications:   •  aspirin 81 mg EC tablet, Take 1 tablet (81 mg) by mouth once daily., Disp: 90 tablet, Rfl: 3  •  metoprolol succinate XL (Toprol-XL) 50 mg 24 hr tablet, Take 1 tablet (50 mg) by mouth once daily., Disp: 90 tablet, Rfl: 3    PHYSICAL EXAM:  /72 (BP Location: Right arm, Patient Position: Lying, BP Cuff Size: Large adult)   Pulse 58   Ht 1.755 m (5' 9.09\")   Wt 101 kg (223 lb 8.7 oz)   SpO2 97%   BMI 32.92 kg/m²   Body mass index is 32.92 kg/m².    General: Well appearing and in no distress  HEENT: Moist mucous membranes  Neck: Supple, JVP normal, Carotid upstrokes brisk bilaterally and without bruits  Respiratory: Clear to ausculation bilaterally; no wheezing/rales/rhonchi; respirations non-labored  Cardiovascular: RRR,normal PMI, normal S1 and S2. No S3 or S4. Grade II/VI JANEE RUSB>LUSB , To and fro murmur mid sternum, no rubs or gallops.   Gastrointestinal: soft, non-tender, no organomegaly, no abnormal aortic pulsation  Extremities: warm and well perfused with no cyanosis, clubbing, or edema  Neurologic: awake/alert, no focal deficits    DATA:    TTE 11/8/24 personally reviewed by me in clinic today:  Normal LV size and function  Mild LVH  Mild AI, no stenosis  [place] MV  Mild MR  Mild MS mean 4 mm Hg  Mild PS - 26, PI is present  Mild TR  Normal RV size and function  Gradient of 30 through the isthmus  Aortic root 4.0  No change from last echo      TTE 11/10/23 personally reviewed by me:  Shones Variant  Normal LV/RV size and function, mild LVH  Dilated aortic root 4.35 cm  Mild hamilton-AI, slightly worse than previous echo  Hypoplastic aortic arch  [place] mitral valve, mean gradient 2.2 mmHg  Trivial MR, no MS  Mild PI, P 1/2 T 131 ms  Mild PS 23 mmHg  Pk systolic gradient in descending aorta 33 " mmHg    MRA Head 3/7/23  Unremarkable examination of the intracranial vasculature. No aneurysm.     Echo 11/11/2022 images reviewed and interpreted by Jesus Winchester MD  Shones Variant  Normal LV/RV size and function, LVH  Dilated aortic root 4.35 cm  Mild AI  Hypoplastic aortic arch   Trivial MR  Mild PI  Mild PS 27  Pk systolic gradient in descending aorta 30 mmHg     4.12.2022 CMRI  IMPRESSION:  1. Shone complex status post coarctation of the aorta repair with  end-to-end anastomosis repair and bicuspid aortic valve S/P Ross  procedure.  2. Mild RV to PA conduit stenosis with peak flow velocity of 250  cm/sec. Mild conduit insufficiency with a regurgitant fraction of  17%.  3. 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 Mild flow  acceleration, peak velocity 251 cm/sec, through the proximal  descending aorta in a nonobstructive pattern by phase contrast.  4. Moderate dilation of the aortic autograft, stable in appearance  from prior cardiac MRI.  5. Trivial-to-mild hamilton-aortic regurgitation.  6. Abnormal mitral valve apparatus with redundant chordal attachments  and abnormal papillary muscle configuration.  7. Mild mitral valve regurgitation.  8. Left atrium appears normal in size.  9. Normal right ventricular size with no hypertrophy. Normal right  ventricular systolic function.  10. Normal left ventricular size with no hypertrophy. Normal left  ventricular systolic function.  11. No pericardial effusion.  12. The coronary arteries were not evaluated on this study.     Echo 10/4/2021  1. The aortic arch is not well visualized by 2-dimensional imaging. There is aliased color flow Doppler through the aortic isthmus.  2. Peak gradient across the area of aortic coarctation is 32.5 mmHg. Corrected for proximal flow velocity, the resulting calculated gradient is 23 mmHg.  3. There is mild conduit stenosis.  4. Mild conduit regurgitation.  5.  Trivial hamilton-aortic valve regurgitation.  6. Mitral valve mean gradient = 4.4 mmHg.  7. Trivial mitral valve regurgitation.  8. Mild biatrial enlargement.  9. Left ventricle is normal in size. Normal systolic function.  10. Qualitatively normal right ventricular size and normal systolic function.  11. No pericardial effusion.        CPET 3/2/2020  Additional Summary:  1. The patient had his BP measured manually on the right arm only at rest, during exercise and in recovery.  2. The patient had an isolated PVC during exercise at 164 bpm and a ventricular couplet in recovery at a heart rate of 116 bpm.  3. Cannot conclusively comment on ST-segments, T-waves, or QTc in the setting of LBBB.  4. No concerning arrhythmias during exercise or in recovery.  5. Normal HR, BP, and SpO2 saturation response with progressive exercise intensities.  6. The patient completed a peak speed of 3.5 mph with a 14.3% grade achieving 14 METS (estimated).         ASSESSMENT & PLAN:      Juan Carlos is a 28 y/oM with Shone's complex variant. He underwent end to end CoA repair in infancy, followed by subaortic membrane resection and aortic valve repair in 1996. In 2010, he underwent Ross Procedure with a #28 mm Homograft.        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  1994 s/p CoA repair via left thoracotomy (end to end anastomosis)  1996 s/p Subaortic membrane resection, AVr  2010 s/p Ross Procedure #28 mm Homograft, resection of subaortic membrane      Patient is doing well, echo today shows stable hypoplastic aortic arch, peak systolic gradient in descending aorta of 30 mmHg  He has an UE limb discrepancy with elevated SBP in RUE versus LUE at times, no difference today, no gradient relative to lower extremity BP  He is on Metoprolol for aortopathy and h/o palpitations, not for BP  We will continue to monitor for changes in RUE BP and will manage accordingly     CMRI 4/2022 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 Mild flow acceleration, peak velocity 251 cm/sec, through the proximal descending aorta in a nonobstructive pattern by phase contrast.     Cross sectional chest imaging is recommended every 3-5 years.   He underwent screening for [place] of [place]amy aneurysms 3/2023 which was negative.  Next in 10 years.     2. Bicupsid Aortic Valve s/p Ross Procedure in 2010, [facility] #28 mm Homograft  Echo today shows stable dilated aortic root 4.0 cm, mild hamilton-AI, mild PI and mild PS  CMRI 4/2022 Trivial-to-mild hamilton-aortic regurgitation.  CMRI 4/2022 Mild RV to PA conduit stenosis with peak flow velocity of 250 cm/sec. Mild conduit insufficiency with a regurgitant fraction of17%.     He has stable moderate dilation of the aortic autograft which we will follow with serial imaging.  Cont ASA 81 mg daily bio-PVR  SBE prophylaxis IS indicated      3. Subaortic Membrane   Echo today shows no LVOT obstruction     4. Mitral Valve Regurgitation  Echo today shows  parachute mitral valve with mild mitral stenosis, mean gradient across the mitral valve of 4 mmHg, mild MR.  CMRI 4/2022 Abnormal mitral valve apparatus with redundant chordal attachments and abnormal papillary muscle configuration.     5. Palpitations  Zio 4/2022 with NSR, brief SVT/VT, not felt by patient. On Metoprolol Succinate 50 mg daily    f/u 1 year with ECHO    Overall, Juan Carlos is doing well with stable findings on today's echo. He does have a stable gradient across the hypoplastic arch into the descending aorta. This may never progress, but will need to follow serial cross sectional imaging.       Thank you for allowing me to participate in the care of this patient.  Please don't hesitate to contact us with any questions or concerns.    Sincerely,    Jesus Winchester MD, MSc  Director, Adult Congenital Heart Disease Program  [facility] [place]

## 2024-11-13 LAB
AORTIC VALVE PEAK VELOCITY: 1.52 M/S
AV PEAK GRADIENT: 9.3 MMHG
BODY SURFACE AREA: 2.22 M2
EJECTION FRACTION APICAL 4 CHAMBER: 61
MITRAL VALVE E/A RATIO: 1.5
MITRAL VALVE E/E' RATIO: 10.79
PULMONIC VALVE MEAN GRADIENT: 12.2 MMHG
PULMONIC VALVE PEAK GRADIENT: 27.5 MMHG
TRICUSPID ANNULAR PLANE SYSTOLIC EXCURSION: 2.8 CM

## 2024-12-30 ENCOUNTER — TELEPHONE (OUTPATIENT)
Dept: PEDIATRIC CARDIOLOGY | Facility: HOSPITAL | Age: 29
End: 2024-12-30
Payer: COMMERCIAL

## 2024-12-30 DIAGNOSIS — R00.2 HEART PALPITATIONS: ICD-10-CM

## 2024-12-30 DIAGNOSIS — Z95.2 H/O AORTIC VALVE REPLACEMENT: ICD-10-CM

## 2024-12-30 RX ORDER — METOPROLOL SUCCINATE 50 MG/1
50 TABLET, EXTENDED RELEASE ORAL DAILY
Qty: 90 TABLET | Refills: 3 | Status: SHIPPED | OUTPATIENT
Start: 2024-12-30 | End: 2025-12-30

## 2024-12-30 RX ORDER — NAPROXEN SODIUM 220 MG/1
81 TABLET, FILM COATED ORAL DAILY
Qty: 90 TABLET | Refills: 3 | Status: SHIPPED | OUTPATIENT
Start: 2024-12-30 | End: 2025-12-30

## 2024-12-30 NOTE — TELEPHONE ENCOUNTER
12/30/24 at 12:35 PM     Spoke with: Patient   [phone]     Discussed refill request for:    Requested Prescriptions     Pending Prescriptions Disp Refills    aspirin 81 mg chewable tablet 90 tablet 3     Sig: Chew 1 tablet (81 mg) once daily.    metoprolol succinate XL (Toprol-XL) 50 mg 24 hr tablet 90 tablet 3     Sig: Take 1 tablet (50 mg) by mouth once daily.       Let Patient know we will sent to requested pharmacy.  Confirmed understanding, no further questions or issues to be addressed. Encouraged reaching out if there was anything else needed.     - Jaida Fitzgerald, RN  [facility] RN Patient Navigator  Pediatric Cardiology  [phone]

## 2025-04-18 ENCOUNTER — HOSPITAL ENCOUNTER (EMERGENCY)
Age: 30
Discharge: HOME | End: 2025-04-18
Attending: EMERGENCY MEDICINE
Payer: COMMERCIAL

## 2025-04-18 ENCOUNTER — APPOINTMENT (OUTPATIENT)
Dept: CT IMAGING | Age: 30
End: 2025-04-18
Payer: COMMERCIAL

## 2025-04-18 VITALS
HEART RATE: 57 BPM | DIASTOLIC BLOOD PRESSURE: 67 MMHG | OXYGEN SATURATION: 98 % | BODY MASS INDEX: 33 KG/M2 | RESPIRATION RATE: 20 BRPM | WEIGHT: 230 LBS | SYSTOLIC BLOOD PRESSURE: 133 MMHG | TEMPERATURE: 97.4 F

## 2025-04-18 DIAGNOSIS — R51.9 ACUTE NONINTRACTABLE HEADACHE, UNSPECIFIED HEADACHE TYPE: Primary | ICD-10-CM

## 2025-04-18 LAB
ANION GAP SERPL CALCULATED.3IONS-SCNC: 11 MMOL/L (ref 7–16)
BASOPHILS # BLD: 0.05 K/UL (ref 0–0.2)
BASOPHILS NFR BLD: 1 % (ref 0–2)
BUN SERPL-MCNC: 9 MG/DL (ref 6–20)
CALCIUM SERPL-MCNC: 9.3 MG/DL (ref 8.6–10.2)
CHLORIDE SERPL-SCNC: 104 MMOL/L (ref 98–107)
CO2 SERPL-SCNC: 23 MMOL/L (ref 22–29)
CREAT SERPL-MCNC: 1 MG/DL (ref 0.7–1.2)
EOSINOPHIL # BLD: 0.2 K/UL (ref 0.05–0.5)
EOSINOPHILS RELATIVE PERCENT: 4 % (ref 0–6)
ERYTHROCYTE [DISTWIDTH] IN BLOOD BY AUTOMATED COUNT: 12.4 % (ref 11.5–15)
GFR, ESTIMATED: >90 ML/MIN/1.73M2
GLUCOSE SERPL-MCNC: 96 MG/DL (ref 74–99)
HCT VFR BLD AUTO: 40.4 % (ref 37–54)
HGB BLD-MCNC: 14.8 G/DL (ref 12.5–16.5)
IMM GRANULOCYTES # BLD AUTO: <0.03 K/UL (ref 0–0.58)
IMM GRANULOCYTES NFR BLD: 0 % (ref 0–5)
LYMPHOCYTES NFR BLD: 1.5 K/UL (ref 1.5–4)
LYMPHOCYTES RELATIVE PERCENT: 30 % (ref 20–42)
MCH RBC QN AUTO: 32.5 PG (ref 26–35)
MCHC RBC AUTO-ENTMCNC: 36.6 G/DL (ref 32–34.5)
MCV RBC AUTO: 88.6 FL (ref 80–99.9)
MONOCYTES NFR BLD: 0.37 K/UL (ref 0.1–0.95)
MONOCYTES NFR BLD: 7 % (ref 2–12)
NEUTROPHILS NFR BLD: 58 % (ref 43–80)
NEUTS SEG NFR BLD: 2.95 K/UL (ref 1.8–7.3)
PLATELET # BLD AUTO: 177 K/UL (ref 130–450)
PMV BLD AUTO: 10.3 FL (ref 7–12)
POTASSIUM SERPL-SCNC: 3.8 MMOL/L (ref 3.5–5)
RBC # BLD AUTO: 4.56 M/UL (ref 3.8–5.8)
SODIUM SERPL-SCNC: 138 MMOL/L (ref 132–146)
WBC OTHER # BLD: 5.1 K/UL (ref 4.5–11.5)

## 2025-04-18 PROCEDURE — 96374 THER/PROPH/DIAG INJ IV PUSH: CPT

## 2025-04-18 PROCEDURE — 2580000003 HC RX 258

## 2025-04-18 PROCEDURE — 96375 TX/PRO/DX INJ NEW DRUG ADDON: CPT

## 2025-04-18 PROCEDURE — 6360000004 HC RX CONTRAST MEDICATION: Performed by: RADIOLOGY

## 2025-04-18 PROCEDURE — 99285 EMERGENCY DEPT VISIT HI MDM: CPT

## 2025-04-18 PROCEDURE — 80048 BASIC METABOLIC PNL TOTAL CA: CPT

## 2025-04-18 PROCEDURE — 70498 CT ANGIOGRAPHY NECK: CPT

## 2025-04-18 PROCEDURE — 85025 COMPLETE CBC W/AUTO DIFF WBC: CPT

## 2025-04-18 PROCEDURE — 70496 CT ANGIOGRAPHY HEAD: CPT

## 2025-04-18 PROCEDURE — 6360000002 HC RX W HCPCS

## 2025-04-18 RX ORDER — KETOROLAC TROMETHAMINE 30 MG/ML
15 INJECTION, SOLUTION INTRAMUSCULAR; INTRAVENOUS ONCE
Status: COMPLETED | OUTPATIENT
Start: 2025-04-18 | End: 2025-04-18

## 2025-04-18 RX ORDER — DIPHENHYDRAMINE HYDROCHLORIDE 50 MG/ML
25 INJECTION, SOLUTION INTRAMUSCULAR; INTRAVENOUS ONCE
Status: COMPLETED | OUTPATIENT
Start: 2025-04-18 | End: 2025-04-18

## 2025-04-18 RX ORDER — ASPIRIN 81 MG/1
81 TABLET ORAL DAILY
COMMUNITY

## 2025-04-18 RX ORDER — IOPAMIDOL 755 MG/ML
70 INJECTION, SOLUTION INTRAVASCULAR
Status: COMPLETED | OUTPATIENT
Start: 2025-04-18 | End: 2025-04-18

## 2025-04-18 RX ORDER — METOCLOPRAMIDE HYDROCHLORIDE 5 MG/ML
10 INJECTION INTRAMUSCULAR; INTRAVENOUS ONCE
Status: COMPLETED | OUTPATIENT
Start: 2025-04-18 | End: 2025-04-18

## 2025-04-18 RX ORDER — DEXAMETHASONE SODIUM PHOSPHATE 10 MG/ML
10 INJECTION, SOLUTION INTRA-ARTICULAR; INTRALESIONAL; INTRAMUSCULAR; INTRAVENOUS; SOFT TISSUE ONCE
Status: COMPLETED | OUTPATIENT
Start: 2025-04-18 | End: 2025-04-18

## 2025-04-18 RX ORDER — 0.9 % SODIUM CHLORIDE 0.9 %
1000 INTRAVENOUS SOLUTION INTRAVENOUS ONCE
Status: COMPLETED | OUTPATIENT
Start: 2025-04-18 | End: 2025-04-18

## 2025-04-18 RX ADMIN — DIPHENHYDRAMINE HYDROCHLORIDE 25 MG: 50 INJECTION INTRAMUSCULAR; INTRAVENOUS at 08:17

## 2025-04-18 RX ADMIN — DEXAMETHASONE SODIUM PHOSPHATE 10 MG: 10 INJECTION INTRAMUSCULAR; INTRAVENOUS at 10:39

## 2025-04-18 RX ADMIN — SODIUM CHLORIDE 1000 ML: 0.9 INJECTION, SOLUTION INTRAVENOUS at 08:17

## 2025-04-18 RX ADMIN — IOPAMIDOL 70 ML: 755 INJECTION, SOLUTION INTRAVENOUS at 09:17

## 2025-04-18 RX ADMIN — KETOROLAC TROMETHAMINE 15 MG: 30 INJECTION, SOLUTION INTRAMUSCULAR; INTRAVENOUS at 10:40

## 2025-04-18 RX ADMIN — METOCLOPRAMIDE HYDROCHLORIDE 10 MG: 5 INJECTION INTRAMUSCULAR; INTRAVENOUS at 08:17

## 2025-04-18 ASSESSMENT — PAIN SCALES - GENERAL: PAINLEVEL_OUTOF10: 10

## 2025-04-18 ASSESSMENT — PAIN - FUNCTIONAL ASSESSMENT: PAIN_FUNCTIONAL_ASSESSMENT: 0-10

## 2025-04-18 NOTE — ED PROVIDER NOTES
[facility] EMERGENCY DEPARTMENT  EMERGENCY DEPARTMENT ENCOUNTER        Pt Name: Lenard GOMEZ  MRN: [id number]  Birthdate 1995  Date of evaluation: 4/18/2025  Provider: Jerad Knight DO  PCP: Ariel Roldan Jr., DO  Note Started: 8:15 AM EDT 4/18/25    CHIEF COMPLAINT       Chief Complaint   Patient presents with    Migraine     Pt states he woke up with a migraine. Startes at the top of the neck and radiates up. Pt took excedrin at home with no relief, denies light sensitivity       HISTORY OF PRESENT ILLNESS: 1 or more Elements   History From: PATIENT     Limitations to history : None    Lenard GOMEZ is a 29 y.o. male with prior history of hypertension currently being treated with metoprolol arriving with the complaint of posterior headache that started 2 days ago.  He reports it is a throbbing pain.  He reports that he has no numbness or tingling and he has been sleeping well.  No prior diagnoses of migraine and he reports no recent headaches prior to this one.  He denies any head trauma.  He also has a congenital heart abnormality called Shone's complex.       Nursing Notes were all reviewed and agreed with or any disagreements were addressed in the HPI.    REVIEW OF SYSTEMS :      Review of Systems    POSITIVE (+): headache  NEGATIVE (-): fever, chills, diarrhea, constipation, chest pain, shortness of breath     SURGICAL HISTORY     Past Surgical History:   Procedure Laterality Date    CARDIAC SURGERY         CURRENTMEDICATIONS       Discharge Medication List as of 4/18/2025 10:51 AM        CONTINUE these medications which have NOT CHANGED    Details   aspirin 81 MG EC tablet Take 1 tablet by mouth dailyHistorical Med      metoprolol succinate (TOPROL XL) 25 MG extended release tablet Take 2 tablets by mouth dailyHistorical Med      ibuprofen (ADVIL;MOTRIN) 200 MG tablet Take 400 mg by mouth every 6 hours as needed for PainHistorical Med      albuterol sulfate HFA (VENTOLIN

## 2025-04-23 ENCOUNTER — ANCILLARY PROCEDURE (OUTPATIENT)
Dept: PEDIATRIC CARDIOLOGY | Facility: HOSPITAL | Age: 30
End: 2025-04-23
Payer: COMMERCIAL

## 2025-04-23 ENCOUNTER — TELEPHONE (OUTPATIENT)
Dept: PEDIATRIC CARDIOLOGY | Facility: HOSPITAL | Age: 30
End: 2025-04-23
Payer: COMMERCIAL

## 2025-04-23 DIAGNOSIS — I47.10 SVT (SUPRAVENTRICULAR TACHYCARDIA) (CMS-HCC): ICD-10-CM

## 2025-04-23 NOTE — TELEPHONE ENCOUNTER
04/23/25 at 12:05 PM     Spoke with: Patient's mother     Discussed plan per Dr. Ansari. Verified patient is taking metoprolol 50mg daily, as well as home address. Let mom know that we're sending a 3 day monitor. We'll call with results when available.  Confirmed understanding, no further questions or issues to be addressed. Encouraged reaching out if there was anything else needed.     - Jaida Fitzgerald, RN  [facility] RN Patient Navigator  Pediatric Cardiology  [phone]

## 2025-04-23 NOTE — TELEPHONE ENCOUNTER
----- Message from Sumeet Ansari sent at 4/23/2025 11:37 AM EDT -----  Regarding: RE: Headache/heart racing  Yes, 72 hour holter. Is he taking his metoprolol? I looked at his most recent studies and I don't think there is anything too concerning or that would cause his symptoms. Other than the potential for SVT.  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 Juan Carlos's mom called about recent concerns. Starting last Thursday, he's been having episodes that start with feeling like his heart is racing for about ten minutes, and then he gets a severe headache. It was so significant last week, he was crying on the phone to his mom, so she took him to the ED at [facility]. They did a head CT (it's in Care Everywhere). ED note said /67, HR 57. An episode like this has happened three times: Thursday, Friday, and Sunday. The headache lasts for a few hours, goes away with excedrin and rest.I told her to call PCP to see if they will refer to neurology. Do you want to send a holter as well, because of the heart racing? He has had a history of SVT/VT on his 2022 monitor, takes metoprolol. PB ordered a Zio at his 2023 appt but he never returned it. He's scheduled to see you in November.

## 2025-04-23 NOTE — TELEPHONE ENCOUNTER
04/23/25 at 11:21 AM     Spoke with: Patient's mother   [phone]    Discussed recent concerns. Starting last Thursday, he's been having episodes that start with feeling like his heart is racing for about ten minutes, and then he gets a severe headache. It was so significant last week, he was crying on the phone to his mom, so she took him to the ED. They did a head CT. ED note said /67, HR 57. This has happened three times: Thursday, Friday, and Sunday. The headache lasts for a few hours, goes away with excedrin and rest.  Suggesting calling PCP to see if they will refer to neurology, but also told her I'll update Dr. Ansari and get back to her.    Confirmed understanding, no further questions or issues to be addressed.     - Jaida Fitzgerald RN  [facility] RN Patient Navigator  Pediatric Cardiology  [phone]  '

## 2025-05-19 PROCEDURE — 93244 EXT ECG>48HR<7D REV&INTERPJ: CPT | Performed by: PEDIATRICS

## 2025-05-27 ENCOUNTER — TELEPHONE (OUTPATIENT)
Dept: PEDIATRIC CARDIOLOGY | Facility: HOSPITAL | Age: 30
End: 2025-05-27
Payer: COMMERCIAL

## 2025-05-27 NOTE — TELEPHONE ENCOUNTER
----- Message from Sumeet Ansari sent at 5/26/2025  2:31 PM EDT -----  Yes, reassuring.  ----- Message -----  From: Jaida Fitzgerald RN  Sent: 5/26/2025   1:17 PM EDT  To: Sumeet Ansari MD    Okay to tell Juan Carlos reassuring holter?

## 2025-05-27 NOTE — TELEPHONE ENCOUNTER
05/27/25 at 2:26 PM     Spoke with: Patient   [phone]     Shared holter results per Dr. Ansari, which were reassuring.  Confirmed understanding, no further questions or issues to be addressed.    - Jaida Fitzgerald, RN  [facility] RN Patient Navigator  Pediatric Cardiology  [phone]

## 2025-05-27 NOTE — TELEPHONE ENCOUNTER
05/27/25 at 2:18 PM     Attempted to contact: Patient   At: [phone]     Attempted contact to share heart monitor results. Call went to voicemail, left message without any identifying PHI asking for return phone call and provided contact info for [facility] program.      - Jaida Fitzgerald, RN  [facility] RN Patient Navigator  Pediatric Cardiology  [phone]